# Patient Record
Sex: MALE | Race: WHITE | Employment: OTHER | URBAN - METROPOLITAN AREA
[De-identification: names, ages, dates, MRNs, and addresses within clinical notes are randomized per-mention and may not be internally consistent; named-entity substitution may affect disease eponyms.]

---

## 2023-08-26 PROBLEM — I10 BENIGN ESSENTIAL HYPERTENSION: Status: ACTIVE | Noted: 2023-08-26

## 2023-08-26 PROBLEM — M10.9 GOUT: Status: ACTIVE | Noted: 2023-08-26

## 2023-08-26 PROBLEM — K21.9 CHRONIC GERD: Status: ACTIVE | Noted: 2023-08-26

## 2023-08-26 PROBLEM — Z96.1 ARTIFICIAL LENS PRESENT: Status: ACTIVE | Noted: 2023-08-26

## 2023-08-26 PROBLEM — R55 NEAR SYNCOPE: Status: ACTIVE | Noted: 2023-08-26

## 2023-08-26 PROBLEM — R10.9 FLANK PAIN: Status: ACTIVE | Noted: 2023-08-26

## 2023-08-26 PROBLEM — E78.2 MIXED HYPERLIPIDEMIA: Status: ACTIVE | Noted: 2023-08-26

## 2023-08-26 PROBLEM — R42 DIZZINESS: Status: ACTIVE | Noted: 2023-08-26

## 2023-08-26 PROBLEM — B34.2 CORONAVIRUS INFECTION: Status: ACTIVE | Noted: 2023-08-26

## 2023-08-26 PROBLEM — H26.40 SECONDARY CATARACT OF LEFT EYE: Status: ACTIVE | Noted: 2023-08-26

## 2023-08-26 PROBLEM — I95.1 ORTHOSTATIC HYPOTENSION: Status: ACTIVE | Noted: 2023-08-26

## 2023-08-26 PROBLEM — K57.92 DIVERTICULITIS: Status: ACTIVE | Noted: 2023-08-26

## 2023-08-26 PROBLEM — I10 HYPERTENSION: Status: ACTIVE | Noted: 2023-08-26

## 2023-08-26 PROBLEM — E83.42 HYPOMAGNESEMIA: Status: ACTIVE | Noted: 2023-08-26

## 2023-08-26 PROBLEM — E87.1 HYPONATREMIA: Status: ACTIVE | Noted: 2023-08-26

## 2023-08-26 PROBLEM — S42.293A CLOSED FRACTURE OF ANATOMICAL NECK OF HUMERUS: Status: ACTIVE | Noted: 2023-08-26

## 2023-08-26 PROBLEM — R10.9 ABDOMINAL PAIN: Status: ACTIVE | Noted: 2023-08-26

## 2023-08-26 PROBLEM — I48.91 ATRIAL FIBRILLATION (MULTI): Status: ACTIVE | Noted: 2023-08-26
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
## 2023-08-26 PROBLEM — J98.59 MEDIASTINAL MASS: Status: ACTIVE | Noted: 2023-08-26

## 2023-08-26 PROBLEM — F32.A DEPRESSION: Status: ACTIVE | Noted: 2023-08-26

## 2023-08-26 PROBLEM — D75.89 MACROCYTOSIS: Status: ACTIVE | Noted: 2023-08-26

## 2023-08-26 PROBLEM — N40.0 BENIGN PROSTATIC HYPERPLASIA: Status: ACTIVE | Noted: 2023-08-26

## 2023-08-26 PROBLEM — G89.29 OTHER CHRONIC PAIN: Status: ACTIVE | Noted: 2023-08-26

## 2023-08-26 PROBLEM — L89.90 PRESSURE ULCER: Status: ACTIVE | Noted: 2023-08-26

## 2023-08-26 PROBLEM — S42.253A CLOSED FRACTURE OF GREATER TUBEROSITY OF HUMERUS: Status: ACTIVE | Noted: 2023-08-26

## 2023-08-26 PROBLEM — M06.9 RHEUMATOID ARTHRITIS (MULTI): Status: ACTIVE | Noted: 2023-08-26

## 2023-08-26 RX ORDER — ALLOPURINOL 300 MG/1
300 TABLET ORAL DAILY
COMMUNITY

## 2023-08-26 RX ORDER — OXYCODONE AND ACETAMINOPHEN 5; 325 MG/1; MG/1
1 TABLET ORAL EVERY 6 HOURS PRN
COMMUNITY
Start: 2022-03-25

## 2023-08-26 RX ORDER — BUPROPION HYDROCHLORIDE 150 MG/1
150 TABLET ORAL DAILY
COMMUNITY

## 2023-08-26 RX ORDER — TAMSULOSIN HYDROCHLORIDE 0.4 MG/1
0.4 CAPSULE ORAL DAILY
COMMUNITY

## 2023-08-26 RX ORDER — PREDNISONE 5 MG/1
5 TABLET ORAL DAILY
COMMUNITY

## 2023-08-26 RX ORDER — FOLIC ACID 1 MG/1
1 TABLET ORAL DAILY
COMMUNITY

## 2023-08-26 RX ORDER — TRAZODONE HYDROCHLORIDE 50 MG/1
50 TABLET ORAL NIGHTLY PRN
COMMUNITY

## 2023-08-26 RX ORDER — PAROXETINE 30 MG/1
45 TABLET, FILM COATED ORAL EVERY MORNING
COMMUNITY

## 2023-08-26 RX ORDER — TIZANIDINE 2 MG/1
2 TABLET ORAL NIGHTLY PRN
COMMUNITY

## 2023-08-26 RX ORDER — OMEPRAZOLE 20 MG/1
20 CAPSULE, DELAYED RELEASE ORAL DAILY
COMMUNITY

## 2023-08-26 RX ORDER — METHOTREXATE 25 MG/ML
0.8 INJECTION, SOLUTION INTRA-ARTERIAL; INTRAMUSCULAR; INTRAVENOUS
COMMUNITY

## 2023-08-26 RX ORDER — OXYCODONE HYDROCHLORIDE 10 MG/1
10 TABLET ORAL EVERY 6 HOURS PRN
COMMUNITY

## 2023-08-26 RX ORDER — PREDNISONE 10 MG/1
10 TABLET ORAL DAILY
COMMUNITY

## 2023-08-26 RX ORDER — HYDROXYCHLOROQUINE SULFATE 200 MG/1
400 TABLET, FILM COATED ORAL DAILY
COMMUNITY

## 2023-08-26 RX ORDER — VERAPAMIL HYDROCHLORIDE 120 MG/1
120 TABLET, FILM COATED, EXTENDED RELEASE ORAL DAILY
COMMUNITY

## 2023-08-26 RX ORDER — BACLOFEN 5 MG/1
5 TABLET ORAL DAILY PRN
COMMUNITY

## 2023-08-26 RX ORDER — VERAPAMIL HYDROCHLORIDE 240 MG/1
240 TABLET, FILM COATED, EXTENDED RELEASE ORAL EVERY MORNING
COMMUNITY

## 2023-08-26 RX ORDER — ASPIRIN 81 MG/1
81 TABLET ORAL DAILY
COMMUNITY

## 2023-10-26 ENCOUNTER — APPOINTMENT (OUTPATIENT)
Dept: OPHTHALMOLOGY | Facility: CLINIC | Age: 73
End: 2023-10-26
Payer: MEDICARE

## 2023-12-30 ENCOUNTER — APPOINTMENT (OUTPATIENT)
Dept: RADIOLOGY | Facility: HOSPITAL | Age: 73
End: 2023-12-30
Payer: MEDICARE

## 2023-12-30 ENCOUNTER — HOSPITAL ENCOUNTER (EMERGENCY)
Facility: HOSPITAL | Age: 73
Discharge: HOME | End: 2023-12-30
Attending: EMERGENCY MEDICINE
Payer: MEDICARE

## 2023-12-30 VITALS
RESPIRATION RATE: 19 BRPM | SYSTOLIC BLOOD PRESSURE: 138 MMHG | DIASTOLIC BLOOD PRESSURE: 80 MMHG | HEART RATE: 99 BPM | TEMPERATURE: 97.5 F | BODY MASS INDEX: 23.8 KG/M2 | WEIGHT: 170 LBS | OXYGEN SATURATION: 97 % | HEIGHT: 71 IN

## 2023-12-30 DIAGNOSIS — R53.1 GENERALIZED WEAKNESS: Primary | ICD-10-CM

## 2023-12-30 LAB
ALBUMIN SERPL-MCNC: 3.5 G/DL (ref 3.5–5)
ALP BLD-CCNC: 110 U/L (ref 35–125)
ALT SERPL-CCNC: 22 U/L (ref 5–40)
ANION GAP SERPL CALC-SCNC: 12 MMOL/L
APPEARANCE UR: CLEAR
AST SERPL-CCNC: 19 U/L (ref 5–40)
BASOPHILS # BLD AUTO: 0.02 X10*3/UL (ref 0–0.1)
BASOPHILS NFR BLD AUTO: 0.1 %
BILIRUB SERPL-MCNC: 1.3 MG/DL (ref 0.1–1.2)
BILIRUB UR STRIP.AUTO-MCNC: NEGATIVE MG/DL
BUN SERPL-MCNC: 24 MG/DL (ref 8–25)
CALCIUM SERPL-MCNC: 9.2 MG/DL (ref 8.5–10.4)
CHLORIDE SERPL-SCNC: 99 MMOL/L (ref 97–107)
CO2 SERPL-SCNC: 25 MMOL/L (ref 24–31)
COLOR UR: YELLOW
CREAT SERPL-MCNC: 0.9 MG/DL (ref 0.4–1.6)
EOSINOPHIL # BLD AUTO: 0 X10*3/UL (ref 0–0.4)
EOSINOPHIL NFR BLD AUTO: 0 %
ERYTHROCYTE [DISTWIDTH] IN BLOOD BY AUTOMATED COUNT: 13.5 % (ref 11.5–14.5)
FLUAV RNA RESP QL NAA+PROBE: NOT DETECTED
FLUBV RNA RESP QL NAA+PROBE: NOT DETECTED
GFR SERPL CREATININE-BSD FRML MDRD: 90 ML/MIN/1.73M*2
GLUCOSE SERPL-MCNC: 79 MG/DL (ref 65–99)
GLUCOSE UR STRIP.AUTO-MCNC: NORMAL MG/DL
HCT VFR BLD AUTO: 42.8 % (ref 41–52)
HGB BLD-MCNC: 14.6 G/DL (ref 13.5–17.5)
HOLD SPECIMEN: NORMAL
HYALINE CASTS #/AREA URNS AUTO: ABNORMAL /LPF
IMM GRANULOCYTES # BLD AUTO: 0.12 X10*3/UL (ref 0–0.5)
IMM GRANULOCYTES NFR BLD AUTO: 0.8 % (ref 0–0.9)
KETONES UR STRIP.AUTO-MCNC: NEGATIVE MG/DL
LACTATE BLDV-SCNC: 1.6 MMOL/L (ref 0.4–2)
LACTATE BLDV-SCNC: 2.9 MMOL/L (ref 0.4–2)
LEUKOCYTE ESTERASE UR QL STRIP.AUTO: NEGATIVE
LYMPHOCYTES # BLD AUTO: 1.74 X10*3/UL (ref 0.8–3)
LYMPHOCYTES NFR BLD AUTO: 11.6 %
MAGNESIUM SERPL-MCNC: 1.6 MG/DL (ref 1.6–3.1)
MCH RBC QN AUTO: 33.9 PG (ref 26–34)
MCHC RBC AUTO-ENTMCNC: 34.1 G/DL (ref 32–36)
MCV RBC AUTO: 99 FL (ref 80–100)
MONOCYTES # BLD AUTO: 1 X10*3/UL (ref 0.05–0.8)
MONOCYTES NFR BLD AUTO: 6.7 %
MUCOUS THREADS #/AREA URNS AUTO: ABNORMAL /LPF
NEUTROPHILS # BLD AUTO: 12.1 X10*3/UL (ref 1.6–5.5)
NEUTROPHILS NFR BLD AUTO: 80.8 %
NITRITE UR QL STRIP.AUTO: NEGATIVE
NRBC BLD-RTO: 0 /100 WBCS (ref 0–0)
PH UR STRIP.AUTO: 7 [PH]
PLATELET # BLD AUTO: 186 X10*3/UL (ref 150–450)
POTASSIUM SERPL-SCNC: 4.1 MMOL/L (ref 3.4–5.1)
PROT SERPL-MCNC: 5.3 G/DL (ref 5.9–7.9)
PROT UR STRIP.AUTO-MCNC: NORMAL MG/DL
RBC # BLD AUTO: 4.31 X10*6/UL (ref 4.5–5.9)
RBC # UR STRIP.AUTO: NEGATIVE /UL
RBC #/AREA URNS AUTO: ABNORMAL /HPF
SARS-COV-2 RNA RESP QL NAA+PROBE: NOT DETECTED
SODIUM SERPL-SCNC: 136 MMOL/L (ref 133–145)
SP GR UR STRIP.AUTO: 1.02
UROBILINOGEN UR STRIP.AUTO-MCNC: NORMAL MG/DL
WBC # BLD AUTO: 15 X10*3/UL (ref 4.4–11.3)
WBC #/AREA URNS AUTO: ABNORMAL /HPF

## 2023-12-30 PROCEDURE — 99284 EMERGENCY DEPT VISIT MOD MDM: CPT | Performed by: EMERGENCY MEDICINE

## 2023-12-30 PROCEDURE — 36415 COLL VENOUS BLD VENIPUNCTURE: CPT | Performed by: EMERGENCY MEDICINE

## 2023-12-30 PROCEDURE — 83735 ASSAY OF MAGNESIUM: CPT | Performed by: EMERGENCY MEDICINE

## 2023-12-30 PROCEDURE — 84155 ASSAY OF PROTEIN SERUM: CPT | Performed by: EMERGENCY MEDICINE

## 2023-12-30 PROCEDURE — 83605 ASSAY OF LACTIC ACID: CPT | Performed by: EMERGENCY MEDICINE

## 2023-12-30 PROCEDURE — 70450 CT HEAD/BRAIN W/O DYE: CPT

## 2023-12-30 PROCEDURE — 71045 X-RAY EXAM CHEST 1 VIEW: CPT

## 2023-12-30 PROCEDURE — 85025 COMPLETE CBC W/AUTO DIFF WBC: CPT | Performed by: EMERGENCY MEDICINE

## 2023-12-30 PROCEDURE — 87077 CULTURE AEROBIC IDENTIFY: CPT | Mod: WESLAB | Performed by: EMERGENCY MEDICINE

## 2023-12-30 PROCEDURE — 87636 SARSCOV2 & INF A&B AMP PRB: CPT | Performed by: EMERGENCY MEDICINE

## 2023-12-30 PROCEDURE — 81001 URINALYSIS AUTO W/SCOPE: CPT | Performed by: EMERGENCY MEDICINE

## 2023-12-30 ASSESSMENT — COLUMBIA-SUICIDE SEVERITY RATING SCALE - C-SSRS
1. IN THE PAST MONTH, HAVE YOU WISHED YOU WERE DEAD OR WISHED YOU COULD GO TO SLEEP AND NOT WAKE UP?: NO
6. HAVE YOU EVER DONE ANYTHING, STARTED TO DO ANYTHING, OR PREPARED TO DO ANYTHING TO END YOUR LIFE?: NO
2. HAVE YOU ACTUALLY HAD ANY THOUGHTS OF KILLING YOURSELF?: NO

## 2023-12-30 ASSESSMENT — PAIN - FUNCTIONAL ASSESSMENT: PAIN_FUNCTIONAL_ASSESSMENT: 0-10

## 2023-12-30 NOTE — ED TRIAGE NOTES
Patient presents for weakness for the last couple days. Patient had an episode today where his legs became weak and he almost fell to th ground. Wife was able to assist and help him to the ground.

## 2023-12-30 NOTE — ED NOTES
Patient rounded on, brief changed as well as repositioned in bed. No other needs at this time.     Bernice See, EMT  12/30/23 1020

## 2023-12-30 NOTE — ED PROVIDER NOTES
EMERGENCY DEPARTMENT ENCOUNTER      Pt Name: Patricio Sierra  MRN: [id number]  Birthdate 1950  Date of evaluation: 12/30/2023  ED Provider: Aysha Verdugo DO     CHIEF COMPLAINT       Chief Complaint   Patient presents with    Weakness, Gen       HISTORY OF PRESENT ILLNESS    Patricio Sierra is a 73 y.o. who presents to the emergency department with a complaint of generalized weakness.  he presents via ems.  he has a history of a connective tissue disorder and rheumatoid arthritis.  he the past several days he has been feeling weaker than normal.  Today he got to the shower and felt as though he can no longer stand on his own.  His wife helped him to the ground.  EMS arrived and found him on the shower unable to get up.  He did not actually pass out or hit his head.  Review of records does demonstrate that he is on hydroxychloroquine, Eliquis for his atrial fibrillation, methotrexate, and chronic opiate use.  The patient does not endorse any acute pain just feels as though he is so weak he cannot move.  No other acute complaints.    Nursing Notes were reviewed.    Outside historians: EMS  REVIEW OF SYSTEMS     Focused ROS performed and negative other than as listed in HPI    PAST MEDICAL HISTORY   History reviewed. No pertinent past medical history.    SURGICAL HISTORY     History reviewed. No pertinent surgical history.    CURRENT MEDICATIONS       Discharge Medication List as of 12/30/2023 10:55 AM        CONTINUE these medications which have NOT CHANGED    Details   allopurinol (Zyloprim) 300 mg tablet Take 1 tablet (300 mg) by mouth once daily., Historical Med      apixaban (Eliquis) 5 mg tablet Take by mouth 2 times a day., Historical Med      aspirin 81 mg EC tablet Take 1 tablet (81 mg) by mouth once daily., Historical Med      baclofen (Lioresal) 5 mg tablet Take 1 tablet (5 mg) by mouth once daily as needed. for 30 day(s), Historical Med      buPROPion XL (Wellbutrin XL) 150 mg 24 hr tablet Take 1 tablet (150  mg) by mouth once daily., Historical Med      edoxaban (Savaysa) 60 mg tablet Take by mouth once daily. As directed, for 30 day(s)., Starting Tue 7/28/2015, Historical Med      folic acid (Folvite) 1 mg tablet Take 1 tablet (1 mg) by mouth once daily., Historical Med      hydroxychloroquine (Plaquenil) 200 mg tablet Take 2 tablets (400 mg) by mouth once daily. with food or milk, Historical Med      methotrexate 25 mg/mL injection 0.8 mL (20 mg) 1 (one) time per week., Historical Med      omeprazole (PriLOSEC) 20 mg DR capsule Take 1 capsule (20 mg) by mouth once daily., Historical Med      oxyCODONE (Roxicodone) 10 mg immediate release tablet Take 1 tablet (10 mg) by mouth every 6 hours if needed., Historical Med      oxyCODONE-acetaminophen (Percocet) 5-325 mg tablet Take 1 tablet by mouth every 6 hours if needed (pain). for 30 day(s), Starting Fri 3/25/2022, Historical Med      PARoxetine (Paxil) 30 mg tablet Take 1.5 tablets (45 mg) by mouth once daily in the morning., Historical Med      !! predniSONE (Deltasone) 10 mg tablet Take 1 tablet (10 mg) by mouth once daily.  for 30 day(s), Historical Med      !! predniSONE (Deltasone) 5 mg tablet Take 1 tablet (5 mg) by mouth once daily., Historical Med      predniSONE 2 mg tablet,delayed release (DR/EC) Take 2 tablets by mouth 2 times a day., Historical Med      tamsulosin (Flomax) 0.4 mg 24 hr capsule 1 capsule (0.4 mg) once daily. for 30 day(s), Historical Med      tiZANidine (Zanaflex) 2 mg tablet Take 1 tablet (2 mg) by mouth as needed at bedtime., Historical Med      traZODone (Desyrel) 50 mg tablet Take 1 tablet (50 mg) by mouth as needed at bedtime., Historical Med      !! verapamil SR (Calan-SR) 120 mg ER tablet Take 1 tablet (120 mg) by mouth once daily., Historical Med      !! verapamil SR (Calan-SR) 240 mg ER tablet Take 1 tablet (240 mg) by mouth once daily in the morning. with food, Historical Med       !! - Potential duplicate medications found. Please  discuss with provider.          ALLERGIES     Prochlorperazine and Enoxaparin    FAMILY HISTORY     No family history on file.     SOCIAL HISTORY       Social History     Socioeconomic History    Marital status:      Spouse name: None    Number of children: None    Years of education: None    Highest education level: None   Occupational History    None   Tobacco Use    Smoking status: None    Smokeless tobacco: None   Substance and Sexual Activity    Alcohol use: None    Drug use: None    Sexual activity: None   Other Topics Concern    None   Social History Narrative    None     Social Determinants of Health     Financial Resource Strain: Not on file   Food Insecurity: Not on file   Transportation Needs: Not on file   Physical Activity: Not on file   Stress: Not on file   Social Connections: Not on file   Intimate Partner Violence: Not on file   Housing Stability: Not on file       PHYSICAL EXAM       ED Triage Vitals [12/30/23 0706]   Temp Heart Rate Resp BP   36.4 °C (97.5 °F) 66 16 123/90      SpO2 Temp Source Heart Rate Source Patient Position   98 % Oral Monitor Lying      BP Location FiO2 (%)     Right arm --        General: Appears chronically ill but in, no acute distress, alert  Head: Head atraumatic; normocephalic  Eyes: normal inspection; no icterus  ENT: mucosa moist without lesion  Neck: Normal inspection, no meningeal signs  Resp: Normal breath sounds, no wheeze or crackles; No respiratory distress  Chest Wall: no tenderness or deformity; 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  RADIOLOGY (Per Emergency Physician):     Interpretation per the Radiologist below, if available at the time of this note:  CT head wo IV contrast   Final Result   1. No acute intracranial abnormality        2. 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 Also, focal   gliotic change about the left external capsule.             MACRO:   None        Signed by: Bobby Leone 12/30/2023 8:39 AM   Dictation workstation:   [id number]      XR chest 1 view   Final Result   No acute cardiopulmonary process. Lung fields hypoinflated        Signed by: Bobby Leone 12/30/2023 8:19 AM   Dictation workstation:   [id number]          LABS:  Abnormal Labs Reviewed   CBC WITH AUTO DIFFERENTIAL - Abnormal; Notable for the following components:       Result Value    WBC 15.0 (*)     RBC 4.31 (*)     Neutrophils Absolute 12.10 (*)     Monocytes Absolute 1.00 (*)     All other components within normal limits   COMPREHENSIVE METABOLIC PANEL - Abnormal; Notable for the following components:    Total Protein 5.3 (*)     Bilirubin, Total 1.3 (*)     All other components within normal limits   BLOOD GAS LACTIC ACID, VENOUS - Abnormal; Notable for the following components:    POCT Lactate, Venous 2.9 (*)     All other components within normal limits   URINALYSIS MICROSCOPIC WITH REFLEX CULTURE - Abnormal; Notable for the following components:    Hyaline Casts, Urine 1+ (*)     All other components within normal limits       All other labs were within normal range or not returned as of this dictation.    EKG:  Personally interpreted by Aysha Verdugo, DO  0711: Atrial fibrillation with ventricular response 118 normal axis no acute ischemic changes    EMERGENCY DEPARTMENT COURSE/MDM:   Patient presents emergency department complaint of generalized weakness.  On exam there are no significant acute physical findings.  Basic workup is initiated  including a viral panel.  Patient is agreeable with this plan of care.  He is provided a bolus of fluids.      ED Course as of 12/30/23 1238   Sat Dec 30, 2023   0846 Wife is present at the bedside.  Case discussed with her.  She notes that over the past several weeks he has been having both a physical and mental decline.  He has been having increasing confusion such as using the remote control to the TV to turn off the heater and has been having increasing weakness and requiring assistance with his ADLs.  So far he is maintaining at home with his wife's assistance and there are no long-term care plans should he be unable to maintain at home.  At this time blood work is returned showing leukocytosis of 15 and mildly elevated lactate at 2.9 but no other specific cause.  Was still awaiting urinalysis and viral panels.  This discussed with the wife who is agreeable with this plan of care. [EF]   1019 Remainder the lab work returned showing no abnormalities.  Viral swabs are negative and magnesium is low normal at 1.6.  Urinalysis demonstrated no evidence of acute urinary tract infection.  Lactate will be rechecked but at this time there is no specific cause for the patient's weakness.  This is discussed with the wife and she request the patient be discharged home.  I did express my concerns about his inability to care for himself at home and she states that she will have her son to help assist and that she is not worried about ability to care for him.  She is comfortable with plan of discharge.  Patient will be transported [facility] pending improvement of lactate. [EF]   1054 Repeat lactate cleared.  Patient stable for discharge. [EF]      ED Course User Index  [EF] Aysha Verdugo DO         Diagnoses as of 12/30/23 1238   Generalized weakness     Meds Administered:  Medications   sodium chloride 0.9 % bolus 1,000 mL (has no administration in time range)     PROCEDURES:  Unless otherwise noted below,  none  Procedures      FINAL IMPRESSION      1. Generalized weakness          DISPOSITION    Discharge 12/30/2023 10:54:31 AM    DISCHARGE   PATIENT REFERRED TO:  Mellissa Clinton MD  [address]  [facility]  [place] [id number]  [phone]            DISCHARGE MEDICATIONS:  Discharge Medication List as of 12/30/2023 10:55 AM           The patient is discharged back to their place of residence.  Discharge diagnosis, instructions and plan were discussed and understood. At the time of discharge the patient was comfortable and was in no apparent distress. Patient is aware of diagnostic uncertainty and was notified though testing is negative here, there is a very small chance that pathology may be missed.  The patient understands these risks and the patient /family understood to return immediately to the emergency department if the symptoms worsen or if they have any additional concerns.      (Comment: Please note this report has been produced using speech recognition software and may contain errors related to that system including errors in grammar, punctuation, and spelling, as well as words and phrases that may be inappropriate.  If there are any questions or concerns please feel free to contact the dictating provider for clarification.)    Aysha Verdugo DO (electronically signed)  Emergency Medicine Physician    Medical Decision Making             Aysha Verdugo DO  12/30/23 4039

## 2024-01-03 LAB — BACTERIA BLD CULT: NORMAL

## 2024-01-04 LAB
BACTERIA BLD AEROBE CULT: ABNORMAL
BACTERIA BLD CULT: ABNORMAL
GRAM STN SPEC: ABNORMAL

## 2024-01-06 ENCOUNTER — TELEPHONE (OUTPATIENT)
Dept: PHARMACY | Facility: HOSPITAL | Age: 74
End: 2024-01-06
Payer: MEDICARE

## 2024-01-06 NOTE — PROGRESS NOTES
EDPD Note: Rapid Result Review    Reviewed Mr. Patricio Sierra 's chart regarding a positive coagulase negative staphylococcus blood culture x 1 sample (second sample showed no growth x 4 days, finalized result) which were taken during their recent emergency room visit. The patient was not told about these results prior to leaving the emergency department. Therefore, patient was contacted and given proper education. Patient presented to the ED after EMS was called when patient became very weak in the shower (unable to stand on own). Patient denied any pain or other systemic symptoms while in the ED, just overall generalized weakness. Patient was also tested for SARS-CoV-2, Influenza A/B while in the ED-all came back negative.     No antibiotics were prescribed from the ED, which seems appropriate based on the results of the final blood cultures indicating a likely skin contaminate. Confirmed with patient's wife current status of patient's symptoms, which wife indicated that patient has not worsened, but still lacks normal energy. Denied development of any new systemic symptoms. Discussed with wife since no antibiotics were prescribed after patient's visit to the ED, and based on current blood cultures, no new prescriptions would be sent by the pharmacy team. Encouraged wife that if patient's symptoms worsened/changed to contact patient's PCP or have patient return to the ED. Wife acknowledged understanding of care directions.     Susceptibility data from last 90 days.  Collected Specimen Info Organism   12/30/23 Blood culture from Peripheral Venipuncture Coagulase negative staphylococcus       No further follow up needed from EDPD Team.     Antonia Spencer, PharmD